# Patient Record
Sex: FEMALE | Race: WHITE | NOT HISPANIC OR LATINO
[De-identification: names, ages, dates, MRNs, and addresses within clinical notes are randomized per-mention and may not be internally consistent; named-entity substitution may affect disease eponyms.]

---

## 2020-10-19 PROBLEM — Z00.00 ENCOUNTER FOR PREVENTIVE HEALTH EXAMINATION: Status: ACTIVE | Noted: 2020-10-19

## 2020-11-03 ENCOUNTER — APPOINTMENT (OUTPATIENT)
Dept: NEUROLOGY | Facility: CLINIC | Age: 80
End: 2020-11-03
Payer: MEDICARE

## 2020-11-03 VITALS
HEIGHT: 62 IN | DIASTOLIC BLOOD PRESSURE: 81 MMHG | SYSTOLIC BLOOD PRESSURE: 135 MMHG | BODY MASS INDEX: 16.2 KG/M2 | WEIGHT: 88 LBS | HEART RATE: 67 BPM | TEMPERATURE: 97.5 F

## 2020-11-03 PROCEDURE — 99204 OFFICE O/P NEW MOD 45 MIN: CPT

## 2020-11-04 NOTE — HISTORY OF PRESENT ILLNESS
[FreeTextEntry1] : This is an 80-year-old woman who is being seen in neurologic consultation for evaluation of problems with memory. She is accompanied to this visit by her son. At present she lives in an independent apartment about a mile from her son. He has noticed problems with her memory over the last couple of years. It worsened after she had chemotherapy for ovarian cancer. She did undergo an oophorectomy and hysterectomy.\par She does not drive anymore. She was getting lost. She gave up driving about 8 months ago.\par Patient herself thinks that her memory issues are mild and age-related. She does not have any problems with vivid hallucinations or difficulty sleeping at night. Overall she is comfortable at home. She does not cook but tends to eat the same 4 items regularly. Her son tells me that she does have a history of possible under eating and has always had trouble maintaining weight. There is no family history of dementia.\par Her son takes care of finances. She does not cook.

## 2020-11-04 NOTE — ASSESSMENT
[FreeTextEntry1] : She does have dementia. Imaging as outlined below. I would like her to begin donepezil.\par Neuropsychological testing likely we'll not be beneficial or change treatment plan.\par Her son will take over medication administration.\par Discussed being physically, socially, and mentally active.\par

## 2020-11-04 NOTE — PHYSICAL EXAM
[FreeTextEntry1] : Physical examination \par General: No acute distress, Awake, Alert. \par Fundus: Unable\par Neck: no Carotid bruit. \par Cardiovascular: Normal rate, Regular rhythm, No murmur. \par Chest - clear bilaterally\par \par Mental status \par Awake, alert, and oriented to person,NOT  time and place, Impaired attention span and concentration, Recent and remote memory NOT intact, Language intact, Fund of knowledge impaired. MOCA 13/30\par Cranial Nerves \par II: VFF \par III, IV, VI: PERRL, EOMI. \par V: Facial sensation is normal B/L. \par VII: Facial strength is normal B/L. \par VIII: Gross hearing is intact. \par IX, X: Palate is midline and elevates symmetrically. \par XI: Trapezius normal strength. \par XII: Tongue midline without atrophy or fasciculations. \par \par Motor exam \par Muscle tone - no evidence of rigidity or resistance in all 4 extremities. \par No atrophy or fasciculations \par Muscle Strength: arms and legs, proximal and distal flexors and extensors are normal \par No UE drift.\par \par Reflexes \par All present, normal, and symmetrical. \par Plantars right: mute. \par Plantars left: mute. \par \par Coordination \par Finger to nose: Normal. \par Heel to shin: Normal. \par \par Sensory \par Intact sensation to vibration and cold.\par \par Gait \par Normal\par

## 2020-11-04 NOTE — CONSULT LETTER
[Dear  ___] : Dear  [unfilled], [Consult Letter:] : I had the pleasure of evaluating your patient, [unfilled]. [Please see my note below.] : Please see my note below. [FreeTextEntry3] : Sincerely,\par \par Nba Burden M.D.\par

## 2020-11-05 RX ORDER — PHENOBARBITAL, HYOSCYAMINE SULFATE, ATROPINE SULFATE, SCOPOLAMINE HYDROBROMIDE 16.2; .1037; .0194; .0065 MG/1; MG/1; MG/1; MG/1
16.2 TABLET ORAL
Refills: 0 | Status: ACTIVE | COMMUNITY

## 2021-01-13 ENCOUNTER — APPOINTMENT (OUTPATIENT)
Dept: NEUROLOGY | Facility: CLINIC | Age: 81
End: 2021-01-13
Payer: MEDICARE

## 2021-01-13 VITALS
WEIGHT: 89 LBS | HEART RATE: 62 BPM | HEIGHT: 64 IN | OXYGEN SATURATION: 100 % | TEMPERATURE: 96.9 F | DIASTOLIC BLOOD PRESSURE: 86 MMHG | SYSTOLIC BLOOD PRESSURE: 151 MMHG | BODY MASS INDEX: 15.19 KG/M2

## 2021-01-13 DIAGNOSIS — R26.89 OTHER ABNORMALITIES OF GAIT AND MOBILITY: ICD-10-CM

## 2021-01-13 PROCEDURE — 99214 OFFICE O/P EST MOD 30 MIN: CPT

## 2021-01-14 NOTE — ASSESSMENT
[FreeTextEntry1] : Increase Aricept 10 mg daily\par Add Namenda - \par discussed GI side effects.\par For balance Home PT.

## 2021-01-14 NOTE — PHYSICAL EXAM
[FreeTextEntry1] : Physical examination \par General: No acute distress, Awake, Alert. \par Cardiovascular: Normal rate, Regular rhythm, No murmur. \par Chest - clear bilaterally\par \par Mental status \par Awake, alert, and oriented to person,NOT  time and place, Impaired attention span and concentration, Recent and remote memory NOT intact, Language intact, Fund of knowledge impaired. MOCA 13/30\par Cranial Nerves \par II: VFF \par III, IV, VI: PERRL, EOMI. \par V: Facial sensation is normal B/L. \par VII: Facial strength is normal B/L. \par VIII: Gross hearing is intact. \par IX, X: Palate is midline and elevates symmetrically. \par XI: Trapezius normal strength. \par XII: Tongue midline without atrophy or fasciculations. \par \par Motor exam \par Muscle tone - no evidence of rigidity or resistance in all 4 extremities. \par No atrophy or fasciculations \par Muscle Strength: arms and legs, proximal and distal flexors and extensors are normal \par No UE drift.\par \par Reflexes \par All present, normal, and symmetrical. \par Plantars right: mute. \par Plantars left: mute. \par \par Coordination \par Finger to nose: Normal. \par Heel to shin: Normal. \par \par Sensory \par Intact sensation to vibration and cold.\par \par Gait \par Cautious\par

## 2021-01-14 NOTE — HISTORY OF PRESENT ILLNESS
[FreeTextEntry1] : 1/13/21\par Patient is doing well since last visit. No complaints. Tolerating Aricept.\par Her son feels that balance is worsening. He thinks she will benefit for PT at home.\par \par 11/3/2020\par This is an 80-year-old woman who is being seen in neurologic consultation for evaluation of problems with memory. She is accompanied to this visit by her son. At present she lives in an independent apartment about a mile from her son. He has noticed problems with her memory over the last couple of years. It worsened after she had chemotherapy for ovarian cancer. She did undergo an oophorectomy and hysterectomy.\par She does not drive anymore. She was getting lost. She gave up driving about 8 months ago.\par Patient herself thinks that her memory issues are mild and age-related. She does not have any problems with vivid hallucinations or difficulty sleeping at night. Overall she is comfortable at home. She does not cook but tends to eat the same 4 items regularly. Her son tells me that she does have a history of possible under eating and has always had trouble maintaining weight. There is no family history of dementia.\par Her son takes care of finances. She does not cook.

## 2021-04-14 ENCOUNTER — APPOINTMENT (OUTPATIENT)
Dept: NEUROLOGY | Facility: CLINIC | Age: 81
End: 2021-04-14

## 2021-04-14 ENCOUNTER — APPOINTMENT (OUTPATIENT)
Dept: NEUROLOGY | Facility: CLINIC | Age: 81
End: 2021-04-14
Payer: MEDICARE

## 2021-04-14 VITALS
TEMPERATURE: 97.34 F | DIASTOLIC BLOOD PRESSURE: 76 MMHG | WEIGHT: 89 LBS | BODY MASS INDEX: 15.19 KG/M2 | SYSTOLIC BLOOD PRESSURE: 133 MMHG | HEART RATE: 71 BPM | HEIGHT: 64 IN | OXYGEN SATURATION: 98 %

## 2021-04-14 DIAGNOSIS — F03.90 UNSPECIFIED DEMENTIA W/OUT BEHAVIORAL DISTURBANCE: ICD-10-CM

## 2021-04-14 PROCEDURE — 99213 OFFICE O/P EST LOW 20 MIN: CPT

## 2021-04-15 NOTE — PHYSICAL EXAM
[FreeTextEntry1] : Physical examination \par General: No acute distress, Awake, Alert. \par Cardiovascular: Normal rate, Regular rhythm, No murmur. \par Chest - clear bilaterally\par \par Mental status \par Awake, alert, and oriented to person,NOT  time and place, Impaired attention span and concentration, Recent and remote memory NOT intact, Language intact, Fund of knowledge impaired.\par Cranial Nerves \par II: VFF \par III, IV, VI: PERRL, EOMI. \par V: Facial sensation is normal B/L. \par VII: Facial strength is normal B/L. \par VIII: Gross hearing is intact. \par IX, X: Palate is midline and elevates symmetrically. \par XI: Trapezius normal strength. \par XII: Tongue midline without atrophy or fasciculations. \par \par Motor exam \par Muscle tone - no evidence of rigidity or resistance in all 4 extremities. \par No atrophy or fasciculations \par Muscle Strength: arms and legs, proximal and distal flexors and extensors are normal \par No UE drift.\par \par Reflexes \par All present, normal, and symmetrical. \par Plantars right: mute. \par Plantars left: mute. \par \par Coordination \par Finger to nose: Normal. \par Heel to shin: Normal. \par \par Sensory \par Intact sensation to vibration and cold.\par \par Gait \par Cautious\par

## 2021-04-15 NOTE — ASSESSMENT
[FreeTextEntry1] : Continue Aricept 10 mg at bedtime.\par Continue Namenda 10 mg b.i.d.\par Agree with having oversight with aides.\par Family lives a mile away.\par Encouraged mental, physical and social activity.

## 2021-04-15 NOTE — HISTORY OF PRESENT ILLNESS
[FreeTextEntry1] : 4/14/21\par Patient is here in followup. Her daughter-in-law accompanies her to this visit. Overall Francy is doing well. With the except and Namenda, there are no further hallucinations or sundowning. She has occasional complaints of stomach ache but this is chronic and the family does not think it's from the medication.\par No recent falls. Does need a cane.\par Loss to feed a stray cat that comes to her door.\par She is continuing to live in her own house with aides. Her eating has not changed. They have manage to get her to drink boost daily.\par Able to dress self. Aides help her bathe.\par \par 1/13/21\par Patient is doing well since last visit. No complaints. Tolerating Aricept.\par Her son feels that balance is worsening. He thinks she will benefit for PT at home.\par \par 11/3/2020\par This is an 80-year-old woman who is being seen in neurologic consultation for evaluation of problems with memory. She is accompanied to this visit by her son. At present she lives in an independent apartment about a mile from her son. He has noticed problems with her memory over the last couple of years. It worsened after she had chemotherapy for ovarian cancer. She did undergo an oophorectomy and hysterectomy.\par She does not drive anymore. She was getting lost. She gave up driving about 8 months ago.\par Patient herself thinks that her memory issues are mild and age-related. She does not have any problems with vivid hallucinations or difficulty sleeping at night. Overall she is comfortable at home. She does not cook but tends to eat the same 4 items regularly. Her son tells me that she does have a history of possible under eating and has always had trouble maintaining weight. There is no family history of dementia.\par Her son takes care of finances. She does not cook.

## 2021-08-10 RX ORDER — MEMANTINE HYDROCHLORIDE 10 MG/1
10 TABLET, FILM COATED ORAL TWICE DAILY
Qty: 60 | Refills: 11 | Status: ACTIVE | COMMUNITY
Start: 2021-01-13 | End: 1900-01-01

## 2021-08-10 RX ORDER — DONEPEZIL HYDROCHLORIDE 10 MG/1
10 TABLET ORAL
Qty: 30 | Refills: 11 | Status: ACTIVE | COMMUNITY
Start: 2020-11-03 | End: 1900-01-01

## 2021-09-27 ENCOUNTER — APPOINTMENT (OUTPATIENT)
Dept: NEUROLOGY | Facility: CLINIC | Age: 81
End: 2021-09-27

## 2021-11-16 ENCOUNTER — APPOINTMENT (OUTPATIENT)
Dept: NEUROLOGY | Facility: CLINIC | Age: 81
End: 2021-11-16

## 2023-01-01 ENCOUNTER — TRANSCRIPTION ENCOUNTER (OUTPATIENT)
Age: 83
End: 2023-01-01